# Patient Record
Sex: MALE | Race: WHITE | Employment: FULL TIME | URBAN - METROPOLITAN AREA
[De-identification: names, ages, dates, MRNs, and addresses within clinical notes are randomized per-mention and may not be internally consistent; named-entity substitution may affect disease eponyms.]

---

## 2021-02-22 ENCOUNTER — HOSPITAL ENCOUNTER (EMERGENCY)
Facility: HOSPITAL | Age: 36
Discharge: HOME | End: 2021-02-22
Payer: COMMERCIAL

## 2021-02-22 VITALS
BODY MASS INDEX: 30.05 KG/M2 | TEMPERATURE: 98 F | OXYGEN SATURATION: 96 % | HEIGHT: 64 IN | RESPIRATION RATE: 16 BRPM | SYSTOLIC BLOOD PRESSURE: 138 MMHG | WEIGHT: 176 LBS | DIASTOLIC BLOOD PRESSURE: 89 MMHG | HEART RATE: 81 BPM

## 2021-02-22 DIAGNOSIS — J02.8 SORE THROAT (VIRAL): Primary | ICD-10-CM

## 2021-02-22 DIAGNOSIS — B97.89 SORE THROAT (VIRAL): Primary | ICD-10-CM

## 2021-02-22 LAB — S PYO AG THROAT QL: NEGATIVE

## 2021-02-22 PROCEDURE — 87880 STREP A ASSAY W/OPTIC: CPT

## 2021-02-22 PROCEDURE — 99282 EMERGENCY DEPT VISIT SF MDM: CPT

## 2021-02-22 NOTE — ED NOTES
Patient reports 2nd vaccine dose was on Friday. He also notes that he began having discomfort in mouth after eating hot chips. No noted uvular swelling. No wounds apparent in mouth. Mucus membranes moist. Denies shortness of breath.

## 2021-02-22 NOTE — ED PROVIDER NOTES
Patient Seen in: [facility], [place] AND [facility] Emergency Department      History   Patient presents with:   Allergic Rxn Allergies    Stated Complaint: vacc reaction    HPI/Subjective:   HPI    78-year-old male presents to the emergency department with complaints of and Rhythm: Normal rate. Heart sounds: No murmur. Pulmonary:      Effort: Pulmonary effort is normal.      Breath sounds: Normal breath sounds. Abdominal:      Palpations: Abdomen is soft. Tenderness: There is no abdominal tenderness.    Muscu

## 2022-04-14 ENCOUNTER — EXTERNAL RECORD (OUTPATIENT)
Dept: OTHER | Age: 37
End: 2022-04-14